# Patient Record
Sex: MALE | Race: WHITE | NOT HISPANIC OR LATINO | Employment: FULL TIME | URBAN - METROPOLITAN AREA
[De-identification: names, ages, dates, MRNs, and addresses within clinical notes are randomized per-mention and may not be internally consistent; named-entity substitution may affect disease eponyms.]

---

## 2018-12-29 ENCOUNTER — COMMUNICATION - HEALTHEAST (OUTPATIENT)
Dept: FAMILY MEDICINE | Facility: CLINIC | Age: 33
End: 2018-12-29

## 2018-12-29 ENCOUNTER — OFFICE VISIT - HEALTHEAST (OUTPATIENT)
Dept: FAMILY MEDICINE | Facility: CLINIC | Age: 33
End: 2018-12-29

## 2018-12-29 DIAGNOSIS — J06.9 UPPER RESPIRATORY TRACT INFECTION, UNSPECIFIED TYPE: ICD-10-CM

## 2018-12-29 DIAGNOSIS — H10.31 ACUTE BACTERIAL CONJUNCTIVITIS OF RIGHT EYE: ICD-10-CM

## 2021-05-24 ENCOUNTER — RECORDS - HEALTHEAST (OUTPATIENT)
Dept: ADMINISTRATIVE | Facility: CLINIC | Age: 36
End: 2021-05-24

## 2021-06-02 VITALS — BODY MASS INDEX: 24.79 KG/M2 | WEIGHT: 149 LBS

## 2021-06-27 NOTE — PROGRESS NOTES
Progress Notes by Whit Davis CNP at 12/29/2018  8:40 AM     Author: Whit Davis CNP Service: -- Author Type: Nurse Practitioner    Filed: 12/29/2018  2:22 PM Encounter Date: 12/29/2018 Status: Signed    : Whit Davis CNP (Nurse Practitioner)       ASSESSMENT:   1. Acute bacterial conjunctivitis of right eye  ciprofloxacin HCl (CILOXAN) 0.3 % ophthalmic solution   2. Upper respiratory tract infection, unspecified type         PLAN:  33-year-old otherwise healthy male presents for evaluation of right eye redness and discharge for 1 day.  Also having URI symptoms for about 1 month.  History and exam today consistent with an acute bacterial conjunctivitis of the right eye.  Prescribed ciprofloxacin ophthalmic solution to manage this.  URI symptoms do sound viral in nature, however have been ongoing for about 1 month with some symptoms suggestive of sinusitis.  A prescription for Augmentin is printed and provided to the patient.  If no improvement over the course of the next 1 week or high fevers develop, he will feel this and take it to completion.  Will follow up with primary care if no improvement in symptoms, will return to clinic with new or worsening symptoms.    I discussed red flag symptoms, return precautions to clinic/ER and follow up care with patient/parent.  Expected clinical course, symptomatic cares advised. Questions answered. Patient/parent amenable with plan.    Patient Instructions:  Patient Instructions     Keep hands out of eyes. Apply clean, hot compresses as needed for crusting and drainage. Discard disposable contact lenses, or disinfect reusable contact lenses. No contact lens wear for one week.  Do not use eye make up while until symptom free for 24 hours, and replace any eye make up that could have been used while symptoms were present.    I did print a prescription for your upper respiratory symptoms as well. If in 2-3 days you are not feeling improved, please  start this medication and take to completion.     If developing vision changes, fever, worsening symptoms, or any other new symptoms, return for evaluation. Otherwise, follow up with primary care as needed.               Antibiotics for Pink Eye  When you need them--and when you dont      Pink eye is a common condition, especially in children. It is also called conjunctivitis. The eyes are pink because they are infected or irritated. They may be itchy and teary, with a watery discharge, and swollen, crusty eyelids.  Doctors often prescribe antibiotic eye drops or ointments for pink eye. But antibiotics dont usually help, according to the American Academy of Ophthalmology. They can do more harm than good. Heres why:  Antibiotics are not usually necessary for pink eye.  Pink eye can be caused by a virus, an allergy, or bacteria.  Pink eye is usually caused by a virus. Viral pink eye usually goes away on its own in a week or so. Antibiotics do not kill viruses.  Pink eye can also be an allergic reaction to something like pollen, dust mites, pets, contact lenses, or cosmetics. This kind of pink eye gets better when you avoid the things that are causing the allergy. Antibiotics dont help allergies.  A third type of pink eye is caused by bacteria. This can be helped by an antibiotic. However, mild bacterial pink eye almost always goes away within ten days without medication.  Antibiotics can cause problems.  Antibiotics can cause itching, stinging, burning, swelling and redness. They can cause more discharge. And they can cause allergic reactions in some people.  Antibiotics can be a waste of money.  Generic antibiotic drops and ointments can cost $12 to $60. For newer, brand name drugs, you can pay over $130. And if you have an antibiotic-resistant infection, you will need more doctor visits and costly medicines.  Who should use antibiotics for pink eye?  You might need antibiotic eye drops and ointments for bacterial  pink eye if:    Your symptoms are severe.     Your immune system is weak. This might happen if you have another illness.     Your infection does not get better in a week without treatment.  Know the symptoms of different kinds of pink eye.    Viral pink eye: Symptoms can include watery eyes along with a cold, flu, or sore throat.     Allergic pink eye: Symptoms include itchy eyes, swollen eyelids and a runny or itchy nose. It is more common in people who have other allergies, such as hay fever or asthma.     Bacterial pink eye: Symptoms include a thick, often yellow-green discharge that lasts all day (usually not with a cold or flu).  This report is for you to use when talking with your health-care provider. It is not a substitute for medical advice and treatment. Use of this report is at your own risk.    2013 Consumer Reports. Developed in cooperation with the American Academy of Ophthalmology. To learn more about the sources used in this report and terms and conditions of use, visit [employer]org/about-us/.  10/2013     ADVICE FROM CONSUMER REPORTS  Steps to help manage pink eye and prevent it from spreading  Soothe pink eye.    Use a clean, cool, wet compress. Cooled artificial tears (over-the-counter) may help.     Allergic pink eye can be helped by cooled antihistamine eye drops (prescription or over-the-counter). Keep windows closed during hay-fever season.  Pink eye is contagious. Try to avoid spreading it.    Wash hands often or use an alcohol-based hand .     Dont touch your eyes.     Dont use contact lenses.     Dont re-use washcloths or tissues.     Change pillowcases often.     Wash sheets and towels in hot water and detergent.     Clean or replace eyewear and make-up that could be infected.  Call the doctor if:     You have eye pain or vision problems, or if you are very sensitive to light.     You had glaucoma surgery in the past.     You took antibiotics for bacterial pink eye  and it doesnt improve in three to four days.     You have viral pink eye that gets worse after a week.  [employer]  is an initiative of  the [facility].   2016. All rights reserved.   [address], [address]  [place] [address]  Privacy Policy  Contact [facility]               SUBJECTIVE:   Giuseppe Nelson is a 33 y.o. male who presents today with right eye redness and discharge for the past 2 days.  Does not affect his vision, no foreign body sensation.  It is itchy, it is not painful.  He has not been running any fevers.  Also notes that he has had URI symptoms including runny nose and intermittent congestion for the past 1 month.  Feels that this is somewhat improved, however it has been ongoing.  No known ill contacts.  Has not taken any medications for his symptoms.  ROS:  Comprehensive 12 pt ROS completed, positives noted in HPI, otherwise negative.      Past Medical History:  There is no problem list on file for this patient.  none    Surgical History:  No past surgical history on file.  none      Family History:  No family history on file.    Reviewed; Non-contributory    Social History     Tobacco Use   Smoking Status Never Smoker   Smokeless Tobacco Never Used       Current Medications:  Current Outpatient Medications on File Prior to Visit   Medication Sig Dispense Refill   ? ranitidine (ZANTAC) 150 MG tablet Take 1 tablet (150 mg total) by mouth 2 (two) times a day. 60 tablet 0     No current facility-administered medications on file prior to visit.        Allergies:   No Known Allergies    OBJECTIVE:   Vitals:    12/29/18 0853   BP: 127/70   Patient Site: Right Arm   Patient Position: Sitting   Cuff Size: Adult Regular   Pulse: 69   Resp: 17   Temp: 97.5  F (36.4  C)   TempSrc: Oral   SpO2: 99%   Weight: 149 lb (67.6 kg)     Physical exam reveals a pleasant 33 y.o. male.   Appears healthy, alert and cooperative. Non-toxic appearance.  Eyes: Right conjunctival injection with  purulent discharge noted in the inner canthus.  EOMI, no pain with EOM.  Pupils equal and reactive.  No periorbital erythema or tenderness.  No pain with palpation over orbital surfaces.  Ears:  Normal appearing auricle. External auditory meatus without excessive cerumen, edema or erythema. normal TMs bilaterally and normal canals bilaterally.  No mastoid tenderness. No pain with palpation over tragus.  Nose:    Mucosa normal. Clear rhinorrhea. No sinus tenderness.  Mouth:  Mucosa pink and moist.  no pharyngitis, no exudate. No trismus. No evidence of PTA. Normal phonation.  Neck: no adenopathy  Lungs: Chest is clear, no wheezing, rhonchi or rales. Symmetric air entry throughout both lung fields.  Heart: regular rate and rhythm, no murmur, rub or gallop  Abdomen: soft, nontender. No masses or organomegaly  Skin: pink, warm, dry, and without lesions on limited skin exam. No rashes.       RADIOLOGY    No results found.    LABORATORY STUDIES    none      Whit Davis, CNP

## 2021-09-13 ENCOUNTER — OFFICE VISIT (OUTPATIENT)
Dept: FAMILY MEDICINE | Facility: CLINIC | Age: 36
End: 2021-09-13
Payer: COMMERCIAL

## 2021-09-13 VITALS
WEIGHT: 159 LBS | SYSTOLIC BLOOD PRESSURE: 122 MMHG | BODY MASS INDEX: 25.55 KG/M2 | HEIGHT: 66 IN | DIASTOLIC BLOOD PRESSURE: 68 MMHG | HEART RATE: 74 BPM

## 2021-09-13 DIAGNOSIS — Z00.00 ROUTINE GENERAL MEDICAL EXAMINATION AT A HEALTH CARE FACILITY: Primary | ICD-10-CM

## 2021-09-13 DIAGNOSIS — Z13.220 SCREENING FOR LIPID DISORDERS: ICD-10-CM

## 2021-09-13 DIAGNOSIS — Z13.1 SCREENING FOR DIABETES MELLITUS: ICD-10-CM

## 2021-09-13 LAB
CHOLEST SERPL-MCNC: 187 MG/DL
FASTING STATUS PATIENT QL REPORTED: YES
FASTING STATUS PATIENT QL REPORTED: YES
GLUCOSE BLD-MCNC: 101 MG/DL (ref 70–125)
HDLC SERPL-MCNC: 55 MG/DL
LDLC SERPL CALC-MCNC: 121 MG/DL
TRIGL SERPL-MCNC: 57 MG/DL

## 2021-09-13 PROCEDURE — 36415 COLL VENOUS BLD VENIPUNCTURE: CPT | Performed by: FAMILY MEDICINE

## 2021-09-13 PROCEDURE — 99385 PREV VISIT NEW AGE 18-39: CPT | Mod: 25 | Performed by: FAMILY MEDICINE

## 2021-09-13 PROCEDURE — 82947 ASSAY GLUCOSE BLOOD QUANT: CPT | Performed by: FAMILY MEDICINE

## 2021-09-13 PROCEDURE — 90471 IMMUNIZATION ADMIN: CPT | Performed by: FAMILY MEDICINE

## 2021-09-13 PROCEDURE — 90686 IIV4 VACC NO PRSV 0.5 ML IM: CPT | Performed by: FAMILY MEDICINE

## 2021-09-13 PROCEDURE — 80061 LIPID PANEL: CPT | Performed by: FAMILY MEDICINE

## 2021-09-13 ASSESSMENT — MIFFLIN-ST. JEOR: SCORE: 1608.97

## 2021-09-13 NOTE — PROGRESS NOTES
SUBJECTIVE:   CC: Giuseppe Nelson is an 35 year old male who presents for preventative health visit.       Patient has been advised of split billing requirements and indicates understanding: Yes     Healthy Habits:     Getting at least 3 servings of Calcium per day:  Yes    Bi-annual eye exam:  NO    Dental care twice a year:  Yes    Sleep apnea or symptoms of sleep apnea:  None    Diet:  Regular (no restrictions)    Frequency of exercise:  4-5 days/week    Duration of exercise:  45-60 minutes    Taking medications regularly:  Yes    Medication side effects:  Not applicable    PHQ-2 Total Score: 0    Additional concerns today:  No      Today's PHQ-2 Score:   PHQ-2 ( 1999 Pfizer) 9/13/2021   Q1: Little interest or pleasure in doing things 0   Q2: Feeling down, depressed or hopeless 0   PHQ-2 Score 0   Q1: Little interest or pleasure in doing things Not at all   Q2: Feeling down, depressed or hopeless Not at all   PHQ-2 Score 0       Abuse: Current or Past(Physical, Sexual or Emotional)- No  Do you feel safe in your environment? Yes    Have you ever done Advance Care Planning? (For example, a Health Directive, POLST, or a discussion with a medical provider or your loved ones about your wishes): No, advance care planning information given to patient to review.  Patient plans to discuss their wishes with loved ones or provider.      Social History     Tobacco Use     Smoking status: Never Smoker     Smokeless tobacco: Never Used   Substance Use Topics     Alcohol use: Not on file       Alcohol Use 9/13/2021   Prescreen: >3 drinks/day or >7 drinks/week? No       Last PSA: No results found for: PSA    Reviewed orders with patient. Reviewed health maintenance and updated orders accordingly - Yes    There is no problem list on file for this patient.    Past Surgical History:   Procedure Laterality Date     WISDOM TOOTH EXTRACTION  2004       Social History     Tobacco Use     Smoking status: Never Smoker     Smokeless  "tobacco: Never Used   Substance Use Topics     Alcohol use: Yes     Comment: 10     Family History   Problem Relation Age of Onset     Coronary Artery Disease Maternal Grandfather      Cancer No family hx of      Diabetes No family hx of      Heart Disease No family hx of          No current outpatient medications on file.     No Known Allergies    Reviewed and updated as needed this visit by clinical staff  Tobacco  Allergies  Meds              Reviewed and updated as needed this visit by Provider  Tobacco  Allergies  Meds  Problems  Med Hx  Surg Hx  Fam Hx  Soc Hx             Review of Systems  CONSTITUTIONAL: NEGATIVE for fever, chills, change in weight  INTEGUMENTARY/SKIN: NEGATIVE for worrisome rashes, moles or lesions  EYES: NEGATIVE for vision changes or irritation  ENT: NEGATIVE for ear, mouth and throat problems  RESP: NEGATIVE for significant cough or SOB  CV: NEGATIVE for chest pain, palpitations or peripheral edema  GI: NEGATIVE for nausea, abdominal pain, heartburn, or change in bowel habits   male: negative for dysuria, hematuria, decreased urinary stream, erectile dysfunction, urethral discharge  MUSCULOSKELETAL: NEGATIVE for significant arthralgias or myalgia  NEURO: NEGATIVE for weakness, dizziness or paresthesias  PSYCHIATRIC: NEGATIVE for changes in mood or affect    OBJECTIVE:   /68 (BP Location: Right arm, Patient Position: Sitting, Cuff Size: Adult Regular)   Pulse 74   Ht 1.676 m (5' 6\")   Wt 72.1 kg (159 lb)   BMI 25.66 kg/m      Physical Exam  GENERAL: healthy, alert and no distress  EYES: Eyes grossly normal to inspection, PERRL and conjunctivae and sclerae normal  HENT: ear canals and TM's normal, nose and mouth without ulcers or lesions  NECK: no adenopathy, no asymmetry, masses, or scars and thyroid normal to palpation  RESP: lungs clear to auscultation - no rales, rhonchi or wheezes  CV: regular rate and rhythm, normal S1 S2, no S3 or S4, no murmur, click or rub, " "no peripheral edema and peripheral pulses strong  ABDOMEN: soft, nontender, no hepatosplenomegaly, no masses and bowel sounds normal   (male): normal male genitalia without lesions or urethral discharge, no hernia  MS: no gross musculoskeletal defects noted, no edema  SKIN: no suspicious lesions or rashes  NEURO: Normal strength and tone, mentation intact and speech normal  PSYCH: mentation appears normal, affect normal/bright    Diagnostic Test Results:  Labs reviewed in Epic  Pending at time of note    ASSESSMENT/PLAN:   1. Routine general medical examination at a health care facility  Routine history and physical, updated in EMR.  Fasting labs updated as below.  Immunizations updated today.  Plan repeat physical in 1 year.  - INFLUENZA VACCINE IM > 6 MONTHS VALENT IIV4 (AFLURIA/FLUZONE)    2. Screening for lipid disorders  Fasting lipid profile updated today.  - Lipid panel reflex to direct LDL Fasting; Future    3. Screening for diabetes mellitus  Fasting glucose updated today.  - Glucose; Future      Patient has been advised of split billing requirements and indicates understanding: Yes     COUNSELING:   Reviewed preventive health counseling, as reflected in patient instructions  Special attention given to:        Regular exercise       Healthy diet/nutrition    Estimated body mass index is 25.66 kg/m  as calculated from the following:    Height as of this encounter: 1.676 m (5' 6\").    Weight as of this encounter: 72.1 kg (159 lb).     Weight management plan: Discussed healthy diet and exercise guidelines    He reports that he has never smoked. He has never used smokeless tobacco.        Dominique Russell MD  [facility]  "

## 2021-09-13 NOTE — PROGRESS NOTES
"    {PROVIDER CHARTING PREFERENCE:669215}    Henry Chin is a 35 year old who presents for the following health issues {ACCOMPANIED BY STATEMENT (Optional):619620}    HPI         {SUPERLIST (Optional):289717}  {additonal problems for provider to add (Optional):393222}    Review of Systems   {ROS COMP (Optional):818685}      Objective    /68 (BP Location: Right arm, Patient Position: Sitting, Cuff Size: Adult Regular)   Pulse 74   Ht 1.676 m (5' 6\")   Wt 72.1 kg (159 lb)   BMI 25.66 kg/m    Body mass index is 25.66 kg/m .  Physical Exam   {Exam List (Optional):572797}    {Diagnostic Test Results (Optional):329067}    {AMBULATORY ATTESTATION (Optional):131739}        "

## 2021-12-03 ENCOUNTER — IMMUNIZATION (OUTPATIENT)
Dept: NURSING | Facility: CLINIC | Age: 36
End: 2021-12-03
Payer: COMMERCIAL

## 2021-12-03 PROCEDURE — 0064A COVID-19,PF,MODERNA (18+ YRS BOOSTER .25ML): CPT

## 2021-12-03 PROCEDURE — 91306 COVID-19,PF,MODERNA (18+ YRS BOOSTER .25ML): CPT

## 2022-10-01 ENCOUNTER — HEALTH MAINTENANCE LETTER (OUTPATIENT)
Age: 37
End: 2022-10-01

## 2023-02-05 ENCOUNTER — HEALTH MAINTENANCE LETTER (OUTPATIENT)
Age: 38
End: 2023-02-05

## 2023-03-09 ASSESSMENT — ENCOUNTER SYMPTOMS
DIZZINESS: 0
ARTHRALGIAS: 0
HEMATURIA: 0
WEAKNESS: 0
HEARTBURN: 0
SORE THROAT: 0
DIARRHEA: 0
ABDOMINAL PAIN: 0
NAUSEA: 0
CHILLS: 0
SHORTNESS OF BREATH: 0
FEVER: 0
DYSURIA: 0
PARESTHESIAS: 0
FREQUENCY: 0
PALPITATIONS: 0
JOINT SWELLING: 0
HEMATOCHEZIA: 0
HEADACHES: 0
NERVOUS/ANXIOUS: 0
COUGH: 0
EYE PAIN: 0
MYALGIAS: 0
CONSTIPATION: 0

## 2023-03-14 RX ORDER — TRIAMCINOLONE ACETONIDE 1 MG/G
CREAM TOPICAL
COMMUNITY
Start: 2022-09-29 | End: 2023-03-15

## 2023-03-15 ENCOUNTER — OFFICE VISIT (OUTPATIENT)
Dept: FAMILY MEDICINE | Facility: CLINIC | Age: 38
End: 2023-03-15
Payer: COMMERCIAL

## 2023-03-15 VITALS
TEMPERATURE: 98.2 F | BODY MASS INDEX: 25.13 KG/M2 | DIASTOLIC BLOOD PRESSURE: 84 MMHG | WEIGHT: 156.4 LBS | HEIGHT: 66 IN | RESPIRATION RATE: 16 BRPM | HEART RATE: 64 BPM | SYSTOLIC BLOOD PRESSURE: 138 MMHG | OXYGEN SATURATION: 97 %

## 2023-03-15 DIAGNOSIS — Z00.00 ROUTINE GENERAL MEDICAL EXAMINATION AT A HEALTH CARE FACILITY: Primary | ICD-10-CM

## 2023-03-15 PROCEDURE — 0124A COVID-19 VACCINE BIVALENT BOOSTER 12+ (PFIZER): CPT | Performed by: FAMILY MEDICINE

## 2023-03-15 PROCEDURE — 99395 PREV VISIT EST AGE 18-39: CPT | Mod: 25 | Performed by: FAMILY MEDICINE

## 2023-03-15 PROCEDURE — 91312 COVID-19 VACCINE BIVALENT BOOSTER 12+ (PFIZER): CPT | Performed by: FAMILY MEDICINE

## 2023-03-15 PROCEDURE — 90686 IIV4 VACC NO PRSV 0.5 ML IM: CPT | Performed by: FAMILY MEDICINE

## 2023-03-15 PROCEDURE — 90472 IMMUNIZATION ADMIN EACH ADD: CPT | Performed by: FAMILY MEDICINE

## 2023-03-15 PROCEDURE — 90471 IMMUNIZATION ADMIN: CPT | Performed by: FAMILY MEDICINE

## 2023-03-15 PROCEDURE — 90715 TDAP VACCINE 7 YRS/> IM: CPT | Performed by: FAMILY MEDICINE

## 2023-03-15 ASSESSMENT — ENCOUNTER SYMPTOMS
SORE THROAT: 0
ARTHRALGIAS: 0
HEMATURIA: 0
FEVER: 0
DIARRHEA: 0
PARESTHESIAS: 0
NAUSEA: 0
DIZZINESS: 0
CHILLS: 0
FREQUENCY: 0
DYSURIA: 0
PALPITATIONS: 0
CONSTIPATION: 0
EYE PAIN: 0
NERVOUS/ANXIOUS: 0
MYALGIAS: 0
WEAKNESS: 0
COUGH: 0
ABDOMINAL PAIN: 0
HEMATOCHEZIA: 0
HEADACHES: 0
SHORTNESS OF BREATH: 0
JOINT SWELLING: 0
HEARTBURN: 0

## 2023-03-15 ASSESSMENT — PAIN SCALES - GENERAL: PAINLEVEL: NO PAIN (0)

## 2023-03-15 NOTE — PROGRESS NOTES
SUBJECTIVE:   CC: Giuseppe is an 37 year old who presents for preventative health visit.     Patient has been advised of split billing requirements and indicates understanding: Yes     Healthy Habits:     Getting at least 3 servings of Calcium per day:  Yes    Bi-annual eye exam:  NO    Dental care twice a year:  Yes    Sleep apnea or symptoms of sleep apnea:  None    Diet:  Regular (no restrictions)    Frequency of exercise:  4-5 days/week    Duration of exercise:  45-60 minutes    Taking medications regularly:  Yes    Medication side effects:  None    PHQ-2 Total Score: 0    Additional concerns today:  No      PROBLEMS TO ADD ON...  1.  Patient is wondering about possibly having a vasectomy.  He would like to talk through the process and what his options are in this regard.  We discussed office vasectomy versus vasectomy in the OR.  He will think about his options and talk with his wife and let us know what he decides.  We can provide an appropriate referral at that point.  2.  Patient's father and his grandfather have both had hernias.  He is wondering if there is any familial predisposition to this, what the symptoms would be, and if there are any ways to prevent this.      Today's PHQ-2 Score:   PHQ-2 ( 1999 Pfizer) 3/9/2023   Q1: Little interest or pleasure in doing things 0   Q2: Feeling down, depressed or hopeless 0   PHQ-2 Score 0   PHQ-2 Total Score (12-17 Years)- Positive if 3 or more points; Administer PHQ-A if positive -   Q1: Little interest or pleasure in doing things Not at all   Q2: Feeling down, depressed or hopeless Not at all   PHQ-2 Score 0           Social History     Tobacco Use     Smoking status: Never     Smokeless tobacco: Never   Substance Use Topics     Alcohol use: Yes     Comment: 10         Alcohol Use 3/9/2023   Prescreen: >3 drinks/day or >7 drinks/week? Yes       Last PSA: No results found for: PSA    Reviewed orders with patient. Reviewed health maintenance and updated orders  accordingly - Yes    BP Readings from Last 3 Encounters:   03/15/23 138/84   09/13/21 122/68    Wt Readings from Last 3 Encounters:   03/15/23 70.9 kg (156 lb 6.4 oz)   09/13/21 72.1 kg (159 lb)   12/29/18 67.6 kg (149 lb)                  There is no problem list on file for this patient.    Past Surgical History:   Procedure Laterality Date     WISDOM TOOTH EXTRACTION  2004       Social History     Tobacco Use     Smoking status: Never     Smokeless tobacco: Never   Substance Use Topics     Alcohol use: Yes     Comment: 10     Family History   Problem Relation Age of Onset     Coronary Artery Disease Maternal Grandfather      Cancer No family hx of      Diabetes No family hx of      Heart Disease No family hx of          No current outpatient medications on file.     No Known Allergies    Reviewed and updated as needed this visit by clinical staff   Tobacco  Allergies  Meds  Problems             Reviewed and updated as needed this visit by Provider   Tobacco  Allergies  Meds  Problems  Med Hx  Surg Hx  Fam Hx  Soc   Hx         Review of Systems   Constitutional: Negative for chills and fever.   HENT: Negative for congestion, ear pain, hearing loss and sore throat.    Eyes: Negative for pain and visual disturbance.   Respiratory: Negative for cough and shortness of breath.    Cardiovascular: Negative for chest pain, palpitations and peripheral edema.   Gastrointestinal: Negative for abdominal pain, constipation, diarrhea, heartburn, hematochezia and nausea.   Genitourinary: Negative for dysuria, frequency, genital sores, hematuria, impotence, penile discharge and urgency.   Musculoskeletal: Negative for arthralgias, joint swelling and myalgias.   Skin: Negative for rash.   Neurological: Negative for dizziness, weakness, headaches and paresthesias.   Psychiatric/Behavioral: Negative for mood changes. The patient is not nervous/anxious.        OBJECTIVE:   /84 (BP Location: Left arm, Patient  "Position: Sitting, Cuff Size: Adult Regular)   Pulse 64   Temp 98.2  F (36.8  C) (Tympanic)   Resp 16   Ht 1.676 m (5' 5.98\")   Wt 70.9 kg (156 lb 6.4 oz)   SpO2 97%   BMI 25.26 kg/m      Physical Exam  GENERAL: healthy, alert and no distress  EYES: Eyes grossly normal to inspection, PERRL and conjunctivae and sclerae normal  HENT: ear canals and TM's normal, nose and mouth without ulcers or lesions  NECK: no adenopathy, no asymmetry, masses, or scars and thyroid normal to palpation  RESP: lungs clear to auscultation - no rales, rhonchi or wheezes  CV: regular rate and rhythm, normal S1 S2, no S3 or S4, no murmur, click or rub, no peripheral edema and peripheral pulses strong  ABDOMEN: soft, nontender, no hepatosplenomegaly, no masses and bowel sounds normal   (male): normal male genitalia without lesions or urethral discharge, no hernia  MS: no gross musculoskeletal defects noted, no edema  SKIN: no suspicious lesions or rashes  NEURO: Normal strength and tone, mentation intact and speech normal  PSYCH: mentation appears normal, affect normal/bright    Diagnostic Test Results:  Labs reviewed in Epic  none     ASSESSMENT/PLAN:     Routine general medical examination at a health care facility  Routine history and physical, updated in EMR.  Fasting labs deferred for a future visit.  Immunizations updated today.  Discussed risk factors for, and symptoms of, direct and indirect hernias.  Plan repeat physical in 1 year.      Patient has been advised of split billing requirements and indicates understanding: Yes      COUNSELING:   Reviewed preventive health counseling, as reflected in patient instructions  Special attention given to:        Regular exercise       Healthy diet/nutrition       Immunizations    Vaccinated for: Covid-19, Influenza and TDAP         Family planning        He reports that he has never smoked. He has never used smokeless tobacco.        Dominique Russell MD  [facility] " [facility]

## 2023-12-19 ENCOUNTER — IMMUNIZATION (OUTPATIENT)
Dept: NURSING | Facility: CLINIC | Age: 38
End: 2023-12-19
Payer: COMMERCIAL

## 2023-12-19 PROCEDURE — 90471 IMMUNIZATION ADMIN: CPT

## 2023-12-19 PROCEDURE — 90686 IIV4 VACC NO PRSV 0.5 ML IM: CPT

## 2024-02-14 ENCOUNTER — PATIENT OUTREACH (OUTPATIENT)
Dept: CARE COORDINATION | Facility: CLINIC | Age: 39
End: 2024-02-14
Payer: COMMERCIAL

## 2024-02-28 ENCOUNTER — PATIENT OUTREACH (OUTPATIENT)
Dept: CARE COORDINATION | Facility: CLINIC | Age: 39
End: 2024-02-28
Payer: COMMERCIAL

## 2024-04-04 SDOH — HEALTH STABILITY: PHYSICAL HEALTH: ON AVERAGE, HOW MANY DAYS PER WEEK DO YOU ENGAGE IN MODERATE TO STRENUOUS EXERCISE (LIKE A BRISK WALK)?: 6 DAYS

## 2024-04-04 SDOH — HEALTH STABILITY: PHYSICAL HEALTH: ON AVERAGE, HOW MANY MINUTES DO YOU ENGAGE IN EXERCISE AT THIS LEVEL?: 40 MIN

## 2024-04-04 ASSESSMENT — SOCIAL DETERMINANTS OF HEALTH (SDOH): HOW OFTEN DO YOU GET TOGETHER WITH FRIENDS OR RELATIVES?: ONCE A WEEK

## 2024-04-08 RX ORDER — DOXYCYCLINE HYCLATE 20 MG
TABLET ORAL
COMMUNITY
Start: 2023-08-17 | End: 2024-04-10

## 2024-04-10 ENCOUNTER — OFFICE VISIT (OUTPATIENT)
Dept: FAMILY MEDICINE | Facility: CLINIC | Age: 39
End: 2024-04-10
Payer: COMMERCIAL

## 2024-04-10 VITALS
HEIGHT: 65 IN | OXYGEN SATURATION: 97 % | DIASTOLIC BLOOD PRESSURE: 70 MMHG | RESPIRATION RATE: 15 BRPM | WEIGHT: 161.5 LBS | HEART RATE: 57 BPM | BODY MASS INDEX: 26.91 KG/M2 | TEMPERATURE: 98.6 F | SYSTOLIC BLOOD PRESSURE: 120 MMHG

## 2024-04-10 DIAGNOSIS — Z71.84 COUNSELING ABOUT TRAVEL: ICD-10-CM

## 2024-04-10 DIAGNOSIS — Z00.00 ROUTINE GENERAL MEDICAL EXAMINATION AT A HEALTH CARE FACILITY: Primary | ICD-10-CM

## 2024-04-10 PROCEDURE — 91320 SARSCV2 VAC 30MCG TRS-SUC IM: CPT | Performed by: FAMILY MEDICINE

## 2024-04-10 PROCEDURE — 90691 TYPHOID VACCINE IM: CPT | Performed by: FAMILY MEDICINE

## 2024-04-10 PROCEDURE — 99395 PREV VISIT EST AGE 18-39: CPT | Mod: 25 | Performed by: FAMILY MEDICINE

## 2024-04-10 PROCEDURE — 90471 IMMUNIZATION ADMIN: CPT | Performed by: FAMILY MEDICINE

## 2024-04-10 PROCEDURE — 90480 ADMN SARSCOV2 VAC 1/ONLY CMP: CPT | Performed by: FAMILY MEDICINE

## 2024-04-10 ASSESSMENT — PAIN SCALES - GENERAL: PAINLEVEL: NO PAIN (0)

## 2024-04-10 NOTE — PATIENT INSTRUCTIONS
Preventive Care Advice   This is general advice given by our system to help you stay healthy. However, your care team may have specific advice just for you. Please talk to your care team about your preventive care needs.  Nutrition  Eat 5 or more servings of fruits and vegetables each day.  Try wheat bread, brown rice and whole grain pasta (instead of white bread, rice, and pasta).  Get enough calcium and vitamin D. Check the label on foods and aim for 100% of the RDA (recommended daily allowance).  Lifestyle  Exercise at least 150 minutes each week   (30 minutes a day, 5 days a week).  Do muscle strengthening activities 2 days a week. These help control your weight and prevent disease.  No smoking.  Wear sunscreen to prevent skin cancer.  Have a dental exam and cleaning every 6 months.  Yearly exams  See your health care team every year to talk about:  Any changes in your health.  Any medicines your care team has prescribed.  Preventive care, family planning, and ways to prevent chronic diseases.  Shots (vaccines)   HPV shots (up to age 26), if you've never had them before.  Hepatitis B shots (up to age 59), if you've never had them before.  COVID-19 shot: Get this shot when it's due.  Flu shot: Get a flu shot every year.  Tetanus shot: Get a tetanus shot every 10 years.  Pneumococcal, hepatitis A, and RSV shots: Ask your care team if you need these based on your risk.  Shingles shot (for age 50 and up).  General health tests  Diabetes screening:  Starting at age 35, Get screened for diabetes at least every 3 years.  If you are younger than age 35, ask your care team if you should be screened for diabetes.  Cholesterol test: At age 39, start having a cholesterol test every 5 years, or more often if advised.  Bone density scan (DEXA): At age 50, ask your care team if you should have this scan for osteoporosis (brittle bones).  Hepatitis C: Get tested at least once in your life.  STIs (sexually transmitted  infections)  Before age 24: Ask your care team if you should be screened for STIs.  After age 24: Get screened for STIs if you're at risk. You are at risk for STIs (including HIV) if:  You are sexually active with more than one person.  You don't use condoms every time.  You or a partner was diagnosed with a sexually transmitted infection.  If you are at risk for HIV, ask about PrEP medicine to prevent HIV.  Get tested for HIV at least once in your life, whether you are at risk for HIV or not.  Cancer screening tests  Cervical cancer screening: If you have a cervix, begin getting regular cervical cancer screening tests at age 21. Most people who have regular screenings with normal results can stop after age 65. Talk about this with your provider.  Breast cancer scan (mammogram): If you've ever had breasts, begin having regular mammograms starting at age 40. This is a scan to check for breast cancer.  Colon cancer screening: It is important to start screening for colon cancer at age 45.  Have a colonoscopy test every 10 years (or more often if you're at risk) Or, ask your provider about stool tests like a FIT test every year or Cologuard test every 3 years.  To learn more about your testing options, visit: https://www.[email or web address]/011072.pdf.  For help making a decision, visit: https://bit.ly/xr16421.  Prostate cancer screening test: If you have a prostate and are age 55 to 69, ask your provider if you would benefit from a yearly prostate cancer screening test.  Lung cancer screening: If you are a current or former smoker age 50 to 80, ask your care team if ongoing lung cancer screenings are right for you.  For informational purposes only. Not to replace the advice of your health care provider. Copyright   2023 [employer]. All rights reserved. Clinically reviewed by the [facility]. Ohanae 234732 - REV 01/24.    Learning About Alcohol Use Disorder  What is alcohol use  disorder?  Alcohol use disorder means that a person drinks alcohol even though it causes harm to themselves or others. It can range from mild to severe. The more symptoms of this disorder you have, the more severe it may be. People who have it may find it hard to control their use of alcohol.  People who have this disorder may argue with others about how much they're drinking. Their job may be affected because of drinking. They may drink when it's dangerous or illegal, such as when they drive. They also may have a strong need, or craving, to drink. They may feel like they must drink just to get by. Their drinking may increase their risk of getting hurt or being in a car crash.  Over time, drinking too much alcohol may cause health problems. These may include high blood pressure, liver problems, or problems with digestion.  What are the symptoms?  Maybe you've wondered about your alcohol habits or how to tell if your drinking is becoming a problem.  Here are some of the symptoms of alcohol use disorder. You may have it if you have two or more of the following symptoms:  You drink larger amounts of alcohol than you ever meant to. Or you've been drinking for a longer time than you ever meant to.  You can't cut down or control your use. Or you constantly wish you could cut down.  You spend a lot of time getting or drinking alcohol or recovering from its effects.  You have strong cravings for alcohol.  You can no longer do your main jobs at work, at school, or at home.  You keep drinking alcohol, even though your use hurts your relationships.  You have stopped doing important activities because of your alcohol use.  You drink alcohol in situations where doing so is dangerous.  You keep drinking alcohol even though you know it's causing health problems.  You need more and more alcohol to get the same effect, or you get less effect from the same amount over time. This is called tolerance.  You have uncomfortable symptoms  "when you stop drinking alcohol or use less. This is called withdrawal.  Alcohol use disorder can range from mild to severe. The more symptoms you have, the more severe the disorder may be.  You might not realize that your drinking is a problem. You might not drink large amounts when you drink. Or you might go for days or weeks between drinking episodes. But even if you don't drink very often, your drinking could still be harmful and put you at risk.  How is alcohol use disorder treated?  Getting help is up to you. But you don't have to do it alone. There are many people and kinds of treatments that can help.  Treatment for alcohol use disorder can include:  Group therapy, one or more types of counseling, and alcohol education.  Medicines that help to:  Reduce withdrawal symptoms and help you safely stop drinking.  Reduce cravings for alcohol.  Support groups. These groups include Alcoholics Anonymous and [facility] (Self-Management and Recovery Training).  Some people are able to stop or cut back on drinking with help from a counselor. People who have moderate to severe alcohol use disorder may need medical treatment. They may need to stay in a hospital or treatment center.  You may have a treatment team to help you. This team may include a psychologist or psychiatrist, counselors, doctors, social workers, nurses, and a . A  helps plan and manage your treatment.  Follow-up care is a key part of your treatment and safety. Be sure to make and go to all appointments, and call your doctor if you are having problems. It's also a good idea to know your test results and keep a list of the medicines you take.  Where can you learn more?  Go to https://www.[facility].net/patiented  Enter H758 in the search box to learn more about \"Learning About Alcohol Use Disorder.\"  Current as of: November 15, 2023               Content Version: 14.0    5036-0265 [employer].   Care instructions " adapted under license by your healthcare professional. If you have questions about a medical condition or this instruction, always ask your healthcare professional. [employer] Inc[employer] disclaims any warranty or liability for your use of this information.

## 2024-04-10 NOTE — PROGRESS NOTES
"Preventive Care Visit  [facility] CLIFTONBESSY Russell MD, Family Medicine  Apr 10, 2024      Assessment & Plan     Routine general medical examination at a health care facility  Routine history and physical, updated in EMR.  Fasting labs deferred for a future visit.  Immunizations updated today.  Plan repeat physical in 1 year.    Counseling about travel  Reviewed CDC guidelines for travel to [facility] today.  Patient believes his childhood immunizations are up to date, and he received a polio booster in 2012.  He is accepting of COVID booster and typhoid vaccine today.  He will think about whether he would like to take along malaria prophylaxis and will let me know closer to his travel time if he would like this Rx sent.              BMI  Estimated body mass index is 26.88 kg/m  as calculated from the following:    Height as of this encounter: 1.651 m (5' 5\").    Weight as of this encounter: 73.3 kg (161 lb 8 oz).       Counseling  Appropriate preventive services were discussed with this patient, including applicable screening as appropriate for fall prevention, nutrition, physical activity, Tobacco-use cessation, weight loss and cognition.  Checklist reviewing preventive services available has been given to the patient.  Reviewed patient's diet, addressing concerns and/or questions.   The patient reports drinking more than one alcoholic drink per day and sometimes engages in binge or excessive drinking. The patient was counseled and given information about possible harmful effects of excessive alcohol intake as well as where to get help for alcohol problems.         Henry Chin is a 38 year old, presenting for the following:  Physical        4/10/2024     2:18 PM   Additional Questions   Roomed by Jovanna Freedman   Accompanied by Self        Health Care Directive  Patient does not have a Health Care Directive or Living Will: Discussed advance care planning with patient; however, patient " declined at this time.    HPI    Going to [facility] in October for 2 weeks with the kids.  Staying in a [facility] area, visiting some villages.  Has been to [facility] before and did not take malaria prophylaxis.              4/4/2024   General Health   How would you rate your overall physical health? Excellent   Feel stress (tense, anxious, or unable to sleep) Not at all         4/4/2024   Nutrition   Three or more servings of calcium each day? Yes   Diet: Regular (no restrictions)   How many servings of fruit and vegetables per day? 4 or more   How many sweetened beverages each day? 0-1         4/4/2024   Exercise   Days per week of moderate/strenous exercise 6 days   Average minutes spent exercising at this level 40 min         4/4/2024   Social Factors   Frequency of gathering with friends or relatives Once a week   Worry food won't last until get money to buy more No   Food not last or not have enough money for food? No   Do you have housing?  Yes   Are you worried about losing your housing? No   Lack of transportation? No   Unable to get utilities (heat,electricity)? No         4/4/2024   Dental   Dentist two times every year? Yes         4/4/2024   TB Screening   Were you born outside of the [place]? No         Today's PHQ-2 Score:       4/10/2024     1:59 PM   PHQ-2 ( 1999 [employer])   Q1: Little interest or pleasure in doing things 0   Q2: Feeling down, depressed or hopeless 0   PHQ-2 Score 0   Q1: Little interest or pleasure in doing things Not at all   Q2: Feeling down, depressed or hopeless Not at all   PHQ-2 Score 0           4/4/2024   Substance Use   Alcohol more than 3/day or more than 7/wk Yes   How often do you have a drink containing alcohol 4 or more times a week   How many alcohol drinks on typical day 1 or 2   How often do you have 5+ drinks at one occasion Never   Audit 2/3 Score 0   How often not able to stop drinking once started Never   How often failed to do what normally expected Never   How often  needed first drink in am after a heavy drinking session Never   How often feeling of guilt or remorse after drinking Never   How often unable to remember what happened the night before Never   Have you or someone else been injured because of your drinking No   Has anyone been concerned or suggested you cut down on drinking No   TOTAL SCORE - AUDIT 4   Do you use any other substances recreationally? No     Social History     Tobacco Use    Smoking status: Never    Smokeless tobacco: Never   Vaping Use    Vaping status: Never Used   Substance Use Topics    Alcohol use: Yes     Comment: 10    Drug use: Never             4/4/2024   One time HIV Screening   Previous HIV test? No         4/4/2024   STI Screening   New sexual partner(s) since last STI/HIV test? No         4/4/2024   Contraception/Family Planning   Questions about contraception or family planning No        Reviewed and updated as needed this visit by Provider   Tobacco  Allergies  Meds   Med Hx  Surg Hx  Fam Hx  Soc Hx Sexual   Activity          History reviewed. No pertinent past medical history.  Past Surgical History:   Procedure Laterality Date    WISDOM TOOTH EXTRACTION  2004     OB History   No obstetric history on file.     BP Readings from Last 3 Encounters:   04/10/24 120/70   03/15/23 138/84   09/13/21 122/68    Wt Readings from Last 3 Encounters:   04/10/24 73.3 kg (161 lb 8 oz)   03/15/23 70.9 kg (156 lb 6.4 oz)   09/13/21 72.1 kg (159 lb)                  There is no problem list on file for this patient.    Past Surgical History:   Procedure Laterality Date    WISDOM TOOTH EXTRACTION  2004       Social History     Tobacco Use    Smoking status: Never    Smokeless tobacco: Never   Substance Use Topics    Alcohol use: Yes     Comment: 10     Family History   Problem Relation Age of Onset    Coronary Artery Disease Maternal Grandfather     Cancer No family hx of     Diabetes No family hx of     Heart Disease No family hx of     Prostate  "Cancer No family hx of     Colon Cancer No family hx of          No current outpatient medications on file.     No Known Allergies      Review of Systems  Constitutional, HEENT, cardiovascular, pulmonary, GI, , musculoskeletal, neuro, skin, endocrine and psych systems are negative, except as otherwise noted.     Objective    Exam  /70   Pulse 57   Temp 98.6  F (37  C) (Temporal)   Resp 15   Ht 1.651 m (5' 5\")   Wt 73.3 kg (161 lb 8 oz)   SpO2 97%   BMI 26.88 kg/m     Estimated body mass index is 26.88 kg/m  as calculated from the following:    Height as of this encounter: 1.651 m (5' 5\").    Weight as of this encounter: 73.3 kg (161 lb 8 oz).    Physical Exam  GENERAL: alert and no distress  EYES: Eyes grossly normal to inspection, PERRL and conjunctivae and sclerae normal  HENT: ear canals and TM's normal, nose and mouth without ulcers or lesions  NECK: no adenopathy, no asymmetry, masses, or scars  RESP: lungs clear to auscultation - no rales, rhonchi or wheezes  CV: regular rate and rhythm, normal S1 S2, no S3 or S4, no murmur, click or rub, no peripheral edema  ABDOMEN: soft, nontender, no hepatosplenomegaly, no masses and bowel sounds normal   (male): normal male genitalia without lesions or urethral discharge, no hernia  MS: no gross musculoskeletal defects noted, no edema  SKIN: no suspicious lesions or rashes  NEURO: Normal strength and tone, mentation intact and speech normal  PSYCH: mentation appears normal, affect normal/bright        Signed Electronically by: Dominique Russell MD    "

## 2024-07-15 ENCOUNTER — NURSE TRIAGE (OUTPATIENT)
Dept: FAMILY MEDICINE | Facility: CLINIC | Age: 39
End: 2024-07-15
Payer: COMMERCIAL

## 2024-07-15 ENCOUNTER — OFFICE VISIT (OUTPATIENT)
Dept: FAMILY MEDICINE | Facility: CLINIC | Age: 39
End: 2024-07-15
Payer: COMMERCIAL

## 2024-07-15 VITALS
HEART RATE: 70 BPM | TEMPERATURE: 99.5 F | OXYGEN SATURATION: 97 % | RESPIRATION RATE: 18 BRPM | DIASTOLIC BLOOD PRESSURE: 75 MMHG | BODY MASS INDEX: 26.83 KG/M2 | WEIGHT: 161.2 LBS | SYSTOLIC BLOOD PRESSURE: 121 MMHG

## 2024-07-15 DIAGNOSIS — R50.9 FEVER, UNSPECIFIED FEVER CAUSE: ICD-10-CM

## 2024-07-15 DIAGNOSIS — H65.92 OME (OTITIS MEDIA WITH EFFUSION), LEFT: Primary | ICD-10-CM

## 2024-07-15 DIAGNOSIS — R55 VASOVAGAL SYNCOPE: ICD-10-CM

## 2024-07-15 LAB
ATRIAL RATE - MUSE: 67 BPM
DEPRECATED S PYO AG THROAT QL EIA: NEGATIVE
DIASTOLIC BLOOD PRESSURE - MUSE: NORMAL MMHG
FLUAV AG SPEC QL IA: NEGATIVE
FLUBV AG SPEC QL IA: NEGATIVE
GROUP A STREP BY PCR: NOT DETECTED
INTERPRETATION ECG - MUSE: NORMAL
P AXIS - MUSE: 68 DEGREES
PR INTERVAL - MUSE: 172 MS
QRS DURATION - MUSE: 100 MS
QT - MUSE: 356 MS
QTC - MUSE: 376 MS
R AXIS - MUSE: 82 DEGREES
SYSTOLIC BLOOD PRESSURE - MUSE: NORMAL MMHG
T AXIS - MUSE: 49 DEGREES
VENTRICULAR RATE- MUSE: 67 BPM

## 2024-07-15 PROCEDURE — 87804 INFLUENZA ASSAY W/OPTIC: CPT | Performed by: PHYSICIAN ASSISTANT

## 2024-07-15 PROCEDURE — 87635 SARS-COV-2 COVID-19 AMP PRB: CPT | Performed by: PHYSICIAN ASSISTANT

## 2024-07-15 PROCEDURE — 99214 OFFICE O/P EST MOD 30 MIN: CPT | Performed by: PHYSICIAN ASSISTANT

## 2024-07-15 PROCEDURE — 93010 ELECTROCARDIOGRAM REPORT: CPT | Performed by: INTERNAL MEDICINE

## 2024-07-15 PROCEDURE — 93005 ELECTROCARDIOGRAM TRACING: CPT | Performed by: PHYSICIAN ASSISTANT

## 2024-07-15 PROCEDURE — 87651 STREP A DNA AMP PROBE: CPT | Performed by: PHYSICIAN ASSISTANT

## 2024-07-15 ASSESSMENT — ENCOUNTER SYMPTOMS
SHORTNESS OF BREATH: 0
FATIGUE: 1
DIARRHEA: 0
DIZZINESS: 1
COUGH: 0
SORE THROAT: 0
VOMITING: 0
ABDOMINAL PAIN: 0
HEADACHES: 0

## 2024-07-15 NOTE — PATIENT INSTRUCTIONS
1) Take antibiotic as prescribed. Take this medication with food to avoid stomach upset.   2) Take Ibuprofen or Tylenol as needed for fever or pain.   3) Additionally picking up Flonase nasal spray and using it once per day may help allow the fluid from behind the left ear drum to drain more easily.   4) Move slowly to avoid any further injury. Avoiding alcohol and cannabis while you are still having dizziness symptoms. Physically rest - Rest your body, and get plenty of sleep and fluids.    Someone should call for an ambulance if you:  ?Cannot be fully woken up  ?Are acting confused or disoriented  ?Have a sudden and persistent change in your behavior  ?Cannot walk normally  ?Have trouble speaking or slurred speech  ?Have severe weakness or cannot move an arm, leg, or 1 side of your face  ?Have a seizure or jerking of your arms or legs you cannot control

## 2024-07-15 NOTE — TELEPHONE ENCOUNTER
Received call from patient's wife. Patient had fainted this morning. He has been experiencing dizziness, fever, sinus pressure, and cough since yesterday afternoon. Patient additionally nauseous. Patient did not sustain any injuries after fainting.     Patient was unconscious within a minute. Patient is alert and oriented now, he is fatigued. He still feels dizzy after getting up.     Patient has been drinking a lot of water, therefore does not believe this is related to dehydration. Patient was very sweaty when he had fainted.     Patient denies any additional symptoms.     Advised that patient should be seen in ED, due to feeling weak or dizzy still after fainting this morning. Patient's wife explains they would not like to be seen in ED as they have long wait times in [facility], and she would like to prevent that. Patient's wife explains she is open to bringing patient to  instead. Patient's wife plans to bring patient to [facility] walk-in clinic for further evaluation.     ELKE Oviedo [facility]    Reason for Disposition   Fainted > 15 minutes ago and still feels weak or dizzy    Additional Information   Negative: Still unconscious   Negative: Still feels dizzy or lightheaded   Negative: Difficult to awaken or acting confused (e.g., disoriented, slurred speech)   Negative: Difficulty breathing   Negative: Bluish (or gray) lips or face   Negative: Shock suspected (e.g., cold/pale/clammy skin, too weak to stand, low BP, rapid pulse)   Negative: Bleeding (e.g., vomiting blood, rectal bleeding or tarry stools, severe vaginal bleeding)   Negative: Chest pain   Negative: Extra heartbeats, irregular heart beating, or heart is beating very fast (i.e., 'palpitations')   Negative: Heart beating < 50 beats per minute OR > 140 beats per minute   Negative: Fainted suddenly after medicine, allergic food or bee sting   Negative: Sounds like a life-threatening emergency to the triager   Negative:  Has diabetes (diabetes mellitus) and fainting from low blood glucose (70 mg/dl [3.9 mmol/l] or below)   Negative: Seizure suspected (e.g., muscle jerking or shaking followed by confusion)   Negative: Heat exhaustion suspected (i.e., dehydration from heat exposure)   Negative: Fainted > 15 minutes ago and still looks pale (pale skin, pallor)    Protocols used: [id number]

## 2024-07-15 NOTE — PROGRESS NOTES
Patient presents with:  Loss of Consciousness: Fainted this morning    Fever: 100 today    sinus pressure: X1 day    hit head: Hit back of his head on right side when he fainted      Clinical Decision Making:  Suspect vasovagal.  EKG is WNL.  No neurologic deficits.  No evidence of skull fracture.  By French C-spine and head injury rules CT is not indicated at this time.  Left ear otitis media noted on exam.  This may be contributing to his dizziness.  Patient started on Augmentin for treatment.  Strep test negative.  Influenza and COVID test in process.  Orthostatic vitals are negative for orthostatic hypotension or POTS.  Patient denies any reasons for severe dehydration he has been continuing to tolerate p.o. fluids well.  No evidence of basilar skull fracture.       ICD-10-CM    1. OME (otitis media with effusion), left  H65.92 amoxicillin-clavulanate (AUGMENTIN) 875-125 MG tablet      2. Fever, unspecified fever cause  R50.9 Streptococcus A Rapid Screen w/Reflex to PCR     Symptomatic COVID-19 Virus (Coronavirus) by PCR Nose     Influenza A & B Antigen - Clinic Collect     Group A Streptococcus PCR Throat Swab      3. Vasovagal syncope  R55 EKG 12-lead, tracing only          Patient Instructions   1) Take antibiotic as prescribed. Take this medication with food to avoid stomach upset.   2) Take Ibuprofen or Tylenol as needed for fever or pain.   3) Additionally picking up Flonase nasal spray and using it once per day may help allow the fluid from behind the left ear drum to drain more easily.   4) Move slowly to avoid any further injury. Avoiding alcohol and cannabis while you are still having dizziness symptoms. Physically rest - Rest your body, and get plenty of sleep and fluids.    Someone should call for an ambulance if you:  ?Cannot be fully woken up  ?Are acting confused or disoriented  ?Have a sudden and persistent change in your behavior  ?Cannot walk normally  ?Have trouble speaking or slurred  speech  ?Have severe weakness or cannot move an arm, leg, or 1 side of your face  ?Have a seizure or jerking of your arms or legs you cannot control        HPI:  Giuseppe Nelson is a 38 year old male who presents today after having had a fainting episode this morning.  Patient was in the bathroom and suddenly felt very dizzy and fatigued.  He felt like he needed to lay down, but did not do it in time.  He fainted and fell.  He came to very quickly, but noticed that he had bumped the back right side of his head right behind his right ear.  His wife was in another room and heard the fall and came to check on him in the bathroom.  He seemed like he was coming to and a little disoriented, but was reoriented very quickly afterwards within a minute or so.  He is not on any blood thinners.  This is never happened to him before.  Yesterday he started feeling under the weather with fatigue and achiness and some sinus drainage.  Today he has been feeling even worse and had a fever of 100.  No sore throat or pains anywhere else.  He denies any things that would be dehydrating such as diarrhea or vomiting and he states that he drinks a lot of water.  He denies any chest pain or shortness of breath.  He has slight soreness where he bumped his head, but no significant headache.  Patient did recently travel home from [place] and his left ear has not popped since he came down from the airplane.  Its uncomfortable.    History obtained from the patient.    Problem List:  There are no relevant problems documented for this patient.      No past medical history on file.    Social History     Tobacco Use    Smoking status: Never    Smokeless tobacco: Never   Substance Use Topics    Alcohol use: Yes     Comment: 10         Review of Systems   Constitutional:  Positive for fatigue.   HENT:  Positive for congestion and postnasal drip. Negative for ear pain and sore throat.    Respiratory:  Negative for cough and shortness of breath.     Cardiovascular:  Negative for chest pain.   Gastrointestinal:  Negative for abdominal pain, diarrhea and vomiting.   Neurological:  Positive for dizziness. Negative for headaches.       Vitals:    07/15/24 1204   BP: 121/75   BP Location: Right arm   Patient Position: Sitting   Cuff Size: Adult Regular   Pulse: 70   Resp: 18   Temp: 99.5  F (37.5  C)   TempSrc: Oral   SpO2: 97%   Weight: 73.1 kg (161 lb 3.2 oz)       Physical Exam  Vitals and nursing note reviewed.   Constitutional:       General: He is not in acute distress.     Appearance: He is not toxic-appearing or diaphoretic.      Comments: Patient is laying down on the table because he been feeling lightheaded   HENT:      Head: Normocephalic and atraumatic.      Right Ear: Tympanic membrane, ear canal and external ear normal.      Left Ear: Ear canal and external ear normal. A middle ear effusion (Clear fluid) is present. Tympanic membrane is erythematous and bulging. Tympanic membrane is not perforated.   Eyes:      Conjunctiva/sclera: Conjunctivae normal.   Cardiovascular:      Rate and Rhythm: Normal rate and regular rhythm.      Heart sounds: No murmur heard.  Pulmonary:      Effort: Pulmonary effort is normal. No respiratory distress.   Neurological:      Mental Status: He is alert.   Psychiatric:         Mood and Affect: Mood normal.         Behavior: Behavior normal.         Thought Content: Thought content normal.         Judgment: Judgment normal.         Results:  Results for orders placed or performed in visit on 07/15/24   EKG 12-lead, tracing only     Status: None (Preliminary result)   Result Value Ref Range    Systolic Blood Pressure  mmHg    Diastolic Blood Pressure  mmHg    Ventricular Rate 67 BPM    Atrial Rate 67 BPM    OR Interval 172 ms    QRS Duration 100 ms     ms    QTc 376 ms    P Axis 68 degrees    R AXIS 82 degrees    T Axis 49 degrees    Interpretation ECG       Sinus rhythm with sinus arrhythmia  Minimal voltage criteria  for LVH, may be normal variant  Borderline ECG  When compared with ECG of 14-JUL-2018 06:00,  Vent. rate has increased BY  27 BPM  T wave inversion no longer evident in Anterior leads  T wave amplitude has decreased in Lateral leads     Streptococcus A Rapid Screen w/Reflex to PCR     Status: Normal    Specimen: Throat; Swab   Result Value Ref Range    Group A Strep antigen Negative Negative         At the end of the encounter, I discussed results, diagnosis, medications. Discussed red flags for immediate return to clinic/ER, as well as indications for follow up if no improvement. Patient understood and agreed to plan. Patient was stable for discharge.

## 2024-07-16 LAB — SARS-COV-2 RNA RESP QL NAA+PROBE: POSITIVE

## 2024-07-17 ENCOUNTER — TELEPHONE (OUTPATIENT)
Dept: NURSING | Facility: CLINIC | Age: 39
End: 2024-07-17
Payer: COMMERCIAL

## 2024-07-17 NOTE — TELEPHONE ENCOUNTER
Patient classified as COVID treatment eligible by Epic high risk algorithm:  Yes    Coronavirus (COVID-19) Notification    Reason for call  Notify of POSITIVE COVID-19 lab result, assess symptoms,  review [facility] recommendations    Lab Result   Lab test for 2019-nCoV rRt-PCR or SARS-COV-2 PCR  Oropharyngeal AND/OR nasopharyngeal swabs were POSITIVE for 2019-nCoV RNA [OR] SARS-COV-2 RNA (COVID-19) RNA     We have been unable to reach patient by phone at this time to notify of their Positive COVID-19 result.    Left voicemail message requesting a call back to [phone] [facility] for results.        A Positive COVID-19 letter will be sent via [employer] or the mail.    Liberty Borden

## 2024-09-10 ENCOUNTER — TELEPHONE (OUTPATIENT)
Dept: FAMILY MEDICINE | Facility: CLINIC | Age: 39
End: 2024-09-10
Payer: COMMERCIAL

## 2024-09-10 NOTE — TELEPHONE ENCOUNTER
We did discuss this at the time of his visit in April, so I would be willing to prescribe this without another visit.  He needs to give me the exact dates of his travel, however, so I know how long to prescribe the medication for.  Remind me, is he going to [facility]?  Any other details he can provide about where in [place] he will be traveling?

## 2024-09-10 NOTE — TELEPHONE ENCOUNTER
Medication Question or Refill    Contacts       Contact Date/Time Type Contact Phone/Fax    09/10/2024 02:21 PM CDT Phone (Incoming) Giuseppe Nelson (Self) [phone] (M)            What medication are you calling about (include dose and sig)?: Malaria    Preferred Pharmacy:   [facility] #[id number] - [place], [place] - [address] AT [facility] [address]  [address] [address]  Phone: [phone] Fax: [phone]      Controlled Substance Agreement on file:   CSA -- Patient Level:    CSA: None found at the patient level.       Who prescribed the medication?: Travel Medicine    Do you need a refill? No    When did you use the medication last? Years ago in 2013    Patient offered an appointment? Yes: Declined Pt only need medication sent over    Do you have any questions or concerns?  No      Could we send this information to you in [employer] or would you prefer to receive a phone call?:   Patient would like to be contacted via [employer]

## 2024-09-10 NOTE — TELEPHONE ENCOUNTER
Attempted to call patient, left message for return call to clinic. Please see provider notation below when patient returns call. Thanks!    Mindy Hull RN

## 2024-09-11 ENCOUNTER — MYC MEDICAL ADVICE (OUTPATIENT)
Dept: FAMILY MEDICINE | Facility: CLINIC | Age: 39
End: 2024-09-11
Payer: COMMERCIAL

## 2024-09-11 DIAGNOSIS — Z71.84 COUNSELING ABOUT TRAVEL: Primary | ICD-10-CM

## 2024-09-11 RX ORDER — ATOVAQUONE AND PROGUANIL HYDROCHLORIDE 250; 100 MG/1; MG/1
1 TABLET, FILM COATED ORAL DAILY
Qty: 25 TABLET | Refills: 0 | Status: SHIPPED | OUTPATIENT
Start: 2024-09-11

## 2024-09-12 NOTE — TELEPHONE ENCOUNTER
Patient medication has been sent in separate encounter. Closing encounter.     Edward Rodríguez RN

## 2024-09-24 ENCOUNTER — TRANSFERRED RECORDS (OUTPATIENT)
Dept: HEALTH INFORMATION MANAGEMENT | Facility: CLINIC | Age: 39
End: 2024-09-24
Payer: COMMERCIAL

## 2024-11-07 ENCOUNTER — TRANSFERRED RECORDS (OUTPATIENT)
Dept: HEALTH INFORMATION MANAGEMENT | Facility: CLINIC | Age: 39
End: 2024-11-07
Payer: COMMERCIAL

## 2025-04-10 SDOH — HEALTH STABILITY: PHYSICAL HEALTH: ON AVERAGE, HOW MANY DAYS PER WEEK DO YOU ENGAGE IN MODERATE TO STRENUOUS EXERCISE (LIKE A BRISK WALK)?: 6 DAYS

## 2025-04-10 SDOH — HEALTH STABILITY: PHYSICAL HEALTH: ON AVERAGE, HOW MANY MINUTES DO YOU ENGAGE IN EXERCISE AT THIS LEVEL?: 50 MIN

## 2025-04-10 ASSESSMENT — SOCIAL DETERMINANTS OF HEALTH (SDOH): HOW OFTEN DO YOU GET TOGETHER WITH FRIENDS OR RELATIVES?: ONCE A WEEK

## 2025-04-14 ENCOUNTER — OFFICE VISIT (OUTPATIENT)
Dept: FAMILY MEDICINE | Facility: CLINIC | Age: 40
End: 2025-04-14
Attending: FAMILY MEDICINE
Payer: COMMERCIAL

## 2025-04-14 VITALS
WEIGHT: 166.3 LBS | DIASTOLIC BLOOD PRESSURE: 86 MMHG | HEIGHT: 67 IN | HEART RATE: 57 BPM | BODY MASS INDEX: 26.1 KG/M2 | SYSTOLIC BLOOD PRESSURE: 132 MMHG | OXYGEN SATURATION: 95 % | RESPIRATION RATE: 15 BRPM | TEMPERATURE: 97.7 F

## 2025-04-14 DIAGNOSIS — R25.2 FOOT CRAMPS: ICD-10-CM

## 2025-04-14 DIAGNOSIS — Z13.220 LIPID SCREENING: ICD-10-CM

## 2025-04-14 DIAGNOSIS — Z00.00 ROUTINE GENERAL MEDICAL EXAMINATION AT A HEALTH CARE FACILITY: Primary | ICD-10-CM

## 2025-04-14 DIAGNOSIS — Z13.1 SCREENING FOR DIABETES MELLITUS: ICD-10-CM

## 2025-04-14 LAB
ALBUMIN SERPL BCG-MCNC: 4.8 G/DL (ref 3.5–5.2)
ALP SERPL-CCNC: 71 U/L (ref 40–150)
ALT SERPL W P-5'-P-CCNC: 54 U/L (ref 0–70)
ANION GAP SERPL CALCULATED.3IONS-SCNC: 11 MMOL/L (ref 7–15)
AST SERPL W P-5'-P-CCNC: 62 U/L (ref 0–45)
BILIRUB SERPL-MCNC: 0.5 MG/DL
BUN SERPL-MCNC: 18.6 MG/DL (ref 6–20)
CALCIUM SERPL-MCNC: 9.9 MG/DL (ref 8.8–10.4)
CHLORIDE SERPL-SCNC: 101 MMOL/L (ref 98–107)
CHOLEST SERPL-MCNC: 187 MG/DL
CREAT SERPL-MCNC: 1.17 MG/DL (ref 0.67–1.17)
EGFRCR SERPLBLD CKD-EPI 2021: 82 ML/MIN/1.73M2
FASTING STATUS PATIENT QL REPORTED: ABNORMAL
FASTING STATUS PATIENT QL REPORTED: ABNORMAL
GLUCOSE SERPL-MCNC: 104 MG/DL (ref 70–99)
HCO3 SERPL-SCNC: 28 MMOL/L (ref 22–29)
HDLC SERPL-MCNC: 50 MG/DL
LDLC SERPL CALC-MCNC: 123 MG/DL
NONHDLC SERPL-MCNC: 137 MG/DL
POTASSIUM SERPL-SCNC: 4.2 MMOL/L (ref 3.4–5.3)
PROT SERPL-MCNC: 7.7 G/DL (ref 6.4–8.3)
SODIUM SERPL-SCNC: 140 MMOL/L (ref 135–145)
TRIGL SERPL-MCNC: 68 MG/DL

## 2025-04-14 PROCEDURE — 99395 PREV VISIT EST AGE 18-39: CPT | Mod: 25 | Performed by: FAMILY MEDICINE

## 2025-04-14 PROCEDURE — 91320 SARSCV2 VAC 30MCG TRS-SUC IM: CPT | Performed by: FAMILY MEDICINE

## 2025-04-14 PROCEDURE — 3079F DIAST BP 80-89 MM HG: CPT | Performed by: FAMILY MEDICINE

## 2025-04-14 PROCEDURE — 80053 COMPREHEN METABOLIC PANEL: CPT | Performed by: FAMILY MEDICINE

## 2025-04-14 PROCEDURE — 1125F AMNT PAIN NOTED PAIN PRSNT: CPT | Performed by: FAMILY MEDICINE

## 2025-04-14 PROCEDURE — 80061 LIPID PANEL: CPT | Performed by: FAMILY MEDICINE

## 2025-04-14 PROCEDURE — 90471 IMMUNIZATION ADMIN: CPT | Performed by: FAMILY MEDICINE

## 2025-04-14 PROCEDURE — 36415 COLL VENOUS BLD VENIPUNCTURE: CPT | Performed by: FAMILY MEDICINE

## 2025-04-14 PROCEDURE — 3075F SYST BP GE 130 - 139MM HG: CPT | Performed by: FAMILY MEDICINE

## 2025-04-14 PROCEDURE — 90656 IIV3 VACC NO PRSV 0.5 ML IM: CPT | Performed by: FAMILY MEDICINE

## 2025-04-14 PROCEDURE — 90480 ADMN SARSCOV2 VAC 1/ONLY CMP: CPT | Performed by: FAMILY MEDICINE

## 2025-04-14 PROCEDURE — 99212 OFFICE O/P EST SF 10 MIN: CPT | Mod: 25 | Performed by: FAMILY MEDICINE

## 2025-04-14 ASSESSMENT — PAIN SCALES - GENERAL: PAINLEVEL_OUTOF10: MILD PAIN (1)

## 2025-04-14 NOTE — PROGRESS NOTES
"Preventive Care Visit  [facility] CLIFTONBESSY Russell MD, Family Medicine  Apr 14, 2025      Assessment & Plan     Routine general medical examination at a health care facility  Routine history and physical, updated in EMR.  Immunizations updated today.  Fasting labs updated also.  Plan repeat physical in 1 year.  - Comprehensive metabolic panel (BMP + Alb, Alk Phos, ALT, AST, Total. Bili, TP); Future    Lipid screening  Fasting lipid profile updated today.  Pending at time of note.  - Lipid panel reflex to direct LDL Fasting; Future    Screening for diabetes mellitus  Fasting blood sugar updated today.  Pending at time of note.  - Comprehensive metabolic panel (BMP + Alb, Alk Phos, ALT, AST, Total. Bili, TP); Future    Foot cramps  Discussed that likely his cramping is related to some metatarsalgia from his workouts.  He had done 3 pretty intense weightbearing exercises in a row.  He can offload with metatarsal pads during his workouts if needed, back off on activities that exacerbate the pain temporarily, consider drinking electrolyte fluids.  If these measures are not helpful, he can let me know and I can refer to podiatry for further evaluation.  - Comprehensive metabolic panel (BMP + Alb, Alk Phos, ALT, AST, Total. Bili, TP); Future            BMI  Estimated body mass index is 25.92 kg/m  as calculated from the following:    Height as of this encounter: 1.706 m (5' 7.17\").    Weight as of this encounter: 75.4 kg (166 lb 4.8 oz).   Weight management plan: Discussed healthy diet and exercise guidelines    Counseling  Appropriate preventive services were addressed with this patient via screening, questionnaire, or discussion as appropriate for fall prevention, nutrition, physical activity, Tobacco-use cessation, social engagement, weight loss and cognition.  Checklist reviewing preventive services available has been given to the patient.  Reviewed patient's diet, addressing concerns and/or " questions.           Subjective   Giuseppe is a 39 year old, presenting for the following:  Physical        4/14/2025     9:26 AM   Additional Questions   Roomed by Heydi RAMÍREZ    Having some foot cramping for the past week or so.  Drinks a lot of water.  Biking more frequently.  Wears running shoes for running, cross training shoes when weight lifting.  Doing some agility, calf training, running.  Pain lasts only seconds, then goes away.  Pain can be in either foot.  Occasional pain in the hands also.  Recently did some more intense housework using his hands.  Never has joint pains in the hands or feet, never joint redness or swelling.           Advance Care Planning  Patient does not have a Health Care Directive: Discussed advance care planning with patient; however, patient declined at this time.      4/10/2025   General Health   How would you rate your overall physical health? Excellent   Feel stress (tense, anxious, or unable to sleep) Not at all         4/10/2025   Nutrition   Three or more servings of calcium each day? Yes   Diet: Regular (no restrictions)   How many servings of fruit and vegetables per day? 4 or more   How many sweetened beverages each day? 0-1         4/10/2025   Exercise   Days per week of moderate/strenous exercise 6 days   Average minutes spent exercising at this level 50 min         4/10/2025   Social Factors   Frequency of gathering with friends or relatives Once a week   Worry food won't last until get money to buy more No   Food not last or not have enough money for food? No   Do you have housing? (Housing is defined as stable permanent housing and does not include staying ouside in a car, in a tent, in an abandoned building, in an overnight shelter, or couch-surfing.) Yes   Are you worried about losing your housing? No   Lack of transportation? No   Unable to get utilities (heat,electricity)? No         4/10/2025   Dental   Dentist two times every year? Yes           4/4/2024    TB Screening   Were you born outside of the [place]? No           Today's PHQ-2 Score:       4/13/2025    10:56 AM   PHQ-2 ( 1999 [employer])   Q1: Little interest or pleasure in doing things 0   Q2: Feeling down, depressed or hopeless 0   PHQ-2 Score 0    Q1: Little interest or pleasure in doing things Not at all   Q2: Feeling down, depressed or hopeless Not at all   PHQ-2 Score 0       Patient-reported           4/10/2025   Substance Use   Alcohol more than 3/day or more than 7/wk No   Do you use any other substances recreationally? No     Social History     Tobacco Use    Smoking status: Never    Smokeless tobacco: Never   Vaping Use    Vaping status: Never Used   Substance Use Topics    Alcohol use: Yes     Comment: 10    Drug use: Never             4/10/2025   One time HIV Screening   Previous HIV test? No         4/10/2025   STI Screening   New sexual partner(s) since last STI/HIV test? No         4/10/2025   Contraception/Family Planning   Questions about contraception or family planning No        Reviewed and updated as needed this visit by Provider   Tobacco  Allergies  Meds  Problems  Med Hx  Surg Hx  Fam Hx  Soc   Hx Sexual Activity          History reviewed. No pertinent past medical history.  Past Surgical History:   Procedure Laterality Date    VASECTOMY  11/2024    WISDOM TOOTH EXTRACTION  2004     OB History   No obstetric history on file.     BP Readings from Last 3 Encounters:   04/14/25 132/86   07/15/24 121/75   04/10/24 120/70    Wt Readings from Last 3 Encounters:   04/14/25 75.4 kg (166 lb 4.8 oz)   07/15/24 73.1 kg (161 lb 3.2 oz)   04/10/24 73.3 kg (161 lb 8 oz)                  There is no problem list on file for this patient.    Past Surgical History:   Procedure Laterality Date    VASECTOMY  11/2024    WISDOM TOOTH EXTRACTION  2004       Social History     Tobacco Use    Smoking status: Never    Smokeless tobacco: Never   Substance Use Topics    Alcohol use: Yes     Comment: 6-7      "Family History   Problem Relation Age of Onset    Coronary Artery Disease Maternal Grandfather     Cancer No family hx of     Diabetes No family hx of     Heart Disease No family hx of     Prostate Cancer No family hx of     Colon Cancer No family hx of          No current outpatient medications on file.     No Known Allergies      Review of Systems  Constitutional, HEENT, cardiovascular, pulmonary, GI, , musculoskeletal, neuro, skin, endocrine and psych systems are negative, except as otherwise noted.     Objective    Exam  /86 (BP Location: Right arm, Patient Position: Sitting, Cuff Size: Adult Regular)   Pulse 57   Temp 97.7  F (36.5  C) (Temporal)   Resp 15   Ht 1.706 m (5' 7.17\")   Wt 75.4 kg (166 lb 4.8 oz)   SpO2 95%   BMI 25.92 kg/m     Estimated body mass index is 25.92 kg/m  as calculated from the following:    Height as of this encounter: 1.706 m (5' 7.17\").    Weight as of this encounter: 75.4 kg (166 lb 4.8 oz).    Physical Exam  GENERAL: alert and no distress  EYES: Eyes grossly normal to inspection, PERRL and conjunctivae and sclerae normal  HENT: ear canals and TM's normal, nose and mouth without ulcers or lesions  NECK: no adenopathy, no asymmetry, masses, or scars  RESP: lungs clear to auscultation - no rales, rhonchi or wheezes  CV: regular rate and rhythm, normal S1 S2, no S3 or S4, no murmur, click or rub, no peripheral edema  ABDOMEN: soft, nontender, no hepatosplenomegaly, no masses and bowel sounds normal   (male): normal male genitalia without lesions or urethral discharge, no hernia  MS: no gross musculoskeletal defects noted, no edema  SKIN: no suspicious lesions or rashes  NEURO: Normal strength and tone, mentation intact and speech normal  PSYCH: mentation appears normal, affect normal/bright        Signed Electronically by: Dominique Russell MD    "

## 2025-04-14 NOTE — PATIENT INSTRUCTIONS
Patient Education   Preventive Care Advice   This is general advice given by our system to help you stay healthy. However, your care team may have specific advice just for you. Please talk to your care team about your preventive care needs.  Nutrition  Eat 5 or more servings of fruits and vegetables each day.  Try wheat bread, brown rice and whole grain pasta (instead of white bread, rice, and pasta).  Get enough calcium and vitamin D. Check the label on foods and aim for 100% of the RDA (recommended daily allowance).  Lifestyle  Exercise at least 150 minutes each week  (30 minutes a day, 5 days a week).  Do muscle strengthening activities 2 days a week. These help control your weight and prevent disease.  No smoking.  Wear sunscreen to prevent skin cancer.  Have a dental exam and cleaning every 6 months.  Yearly exams  See your health care team every year to talk about:  Any changes in your health.  Any medicines your care team has prescribed.  Preventive care, family planning, and ways to prevent chronic diseases.  Shots (vaccines)   HPV shots (up to age 26), if you've never had them before.  Hepatitis B shots (up to age 59), if you've never had them before.  COVID-19 shot: Get this shot when it's due.  Flu shot: Get a flu shot every year.  Tetanus shot: Get a tetanus shot every 10 years.  Pneumococcal, hepatitis A, and RSV shots: Ask your care team if you need these based on your risk.  Shingles shot (for age 50 and up)  General health tests  Diabetes screening:  Starting at age 35, Get screened for diabetes at least every 3 years.  If you are younger than age 35, ask your care team if you should be screened for diabetes.  Cholesterol test: At age 39, start having a cholesterol test every 5 years, or more often if advised.  Bone density scan (DEXA): At age 50, ask your care team if you should have this scan for osteoporosis (brittle bones).  Hepatitis C: Get tested at least once in your life.  STIs (sexually  transmitted infections)  Before age 24: Ask your care team if you should be screened for STIs.  After age 24: Get screened for STIs if you're at risk. You are at risk for STIs (including HIV) if:  You are sexually active with more than one person.  You don't use condoms every time.  You or a partner was diagnosed with a sexually transmitted infection.  If you are at risk for HIV, ask about PrEP medicine to prevent HIV.  Get tested for HIV at least once in your life, whether you are at risk for HIV or not.  Cancer screening tests  Cervical cancer screening: If you have a cervix, begin getting regular cervical cancer screening tests starting at age 21.  Breast cancer scan (mammogram): If you've ever had breasts, begin having regular mammograms starting at age 40. This is a scan to check for breast cancer.  Colon cancer screening: It is important to start screening for colon cancer at age 45.  Have a colonoscopy test every 10 years (or more often if you're at risk) Or, ask your provider about stool tests like a FIT test every year or Cologuard test every 3 years.  To learn more about your testing options, visit:   .  For help making a decision, visit:   https://bit.ly/ao44668.  Prostate cancer screening test: If you have a prostate, ask your care team if a prostate cancer screening test (PSA) at age 55 is right for you.  Lung cancer screening: If you are a current or former smoker ages 50 to 80, ask your care team if ongoing lung cancer screenings are right for you.  For informational purposes only. Not to replace the advice of your health care provider. Copyright   2023 [employer]. All rights reserved. Clinically reviewed by the [facility] Transitions Program. [employer] [id number] - REV 01/24.